# Patient Record
Sex: MALE | URBAN - METROPOLITAN AREA
[De-identification: names, ages, dates, MRNs, and addresses within clinical notes are randomized per-mention and may not be internally consistent; named-entity substitution may affect disease eponyms.]

---

## 2021-05-28 ENCOUNTER — RECORDS - HEALTHEAST (OUTPATIENT)
Dept: ADMINISTRATIVE | Facility: CLINIC | Age: 51
End: 2021-05-28

## 2021-05-29 ENCOUNTER — RECORDS - HEALTHEAST (OUTPATIENT)
Dept: ADMINISTRATIVE | Facility: CLINIC | Age: 51
End: 2021-05-29

## 2021-05-31 ENCOUNTER — RECORDS - HEALTHEAST (OUTPATIENT)
Dept: ADMINISTRATIVE | Facility: CLINIC | Age: 51
End: 2021-05-31

## 2022-01-21 ENCOUNTER — TELEPHONE (OUTPATIENT)
Dept: ONCOLOGY | Facility: CLINIC | Age: 52
End: 2022-01-21

## 2025-07-18 ENCOUNTER — HOSPITAL ENCOUNTER (EMERGENCY)
Facility: CLINIC | Age: 55
Discharge: HOME | End: 2025-07-19
Attending: EMERGENCY MEDICINE | Admitting: EMERGENCY MEDICINE

## 2025-07-18 DIAGNOSIS — L03.319 CELLULITIS AND ABSCESS OF TRUNK: ICD-10-CM

## 2025-07-18 DIAGNOSIS — R73.9 HYPERGLYCEMIA: ICD-10-CM

## 2025-07-18 DIAGNOSIS — L02.219 CELLULITIS AND ABSCESS OF TRUNK: ICD-10-CM

## 2025-07-18 LAB
ANION GAP SERPL CALCULATED.3IONS-SCNC: 13 MMOL/L (ref 7–15)
BASOPHILS # BLD AUTO: 0 10E3/UL (ref 0–0.2)
BASOPHILS NFR BLD AUTO: 1 %
BUN SERPL-MCNC: 7.8 MG/DL (ref 6–20)
CALCIUM SERPL-MCNC: 8.9 MG/DL (ref 8.8–10.4)
CHLORIDE SERPL-SCNC: 98 MMOL/L (ref 98–107)
CREAT SERPL-MCNC: 0.55 MG/DL (ref 0.67–1.17)
EGFRCR SERPLBLD CKD-EPI 2021: >90 ML/MIN/1.73M2
EOSINOPHIL # BLD AUTO: 0.1 10E3/UL (ref 0–0.7)
EOSINOPHIL NFR BLD AUTO: 1 %
ERYTHROCYTE [DISTWIDTH] IN BLOOD BY AUTOMATED COUNT: 11.6 % (ref 10–15)
GLUCOSE SERPL-MCNC: 265 MG/DL (ref 70–99)
HCO3 SERPL-SCNC: 24 MMOL/L (ref 22–29)
HCT VFR BLD AUTO: 40.4 % (ref 40–53)
HGB BLD-MCNC: 14.8 G/DL (ref 13.3–17.7)
HOLD SPECIMEN: NORMAL
IMM GRANULOCYTES # BLD: 0 10E3/UL
IMM GRANULOCYTES NFR BLD: 1 %
LYMPHOCYTES # BLD AUTO: 1.7 10E3/UL (ref 0.8–5.3)
LYMPHOCYTES NFR BLD AUTO: 19 %
MCH RBC QN AUTO: 33.4 PG (ref 26.5–33)
MCHC RBC AUTO-ENTMCNC: 36.6 G/DL (ref 31.5–36.5)
MCV RBC AUTO: 91 FL (ref 78–100)
MONOCYTES # BLD AUTO: 0.9 10E3/UL (ref 0–1.3)
MONOCYTES NFR BLD AUTO: 10 %
NEUTROPHILS # BLD AUTO: 6.1 10E3/UL (ref 1.6–8.3)
NEUTROPHILS NFR BLD AUTO: 69 %
NRBC # BLD AUTO: 0 10E3/UL
NRBC BLD AUTO-RTO: 0 /100
PLATELET # BLD AUTO: 154 10E3/UL (ref 150–450)
POTASSIUM SERPL-SCNC: 3.6 MMOL/L (ref 3.4–5.3)
RBC # BLD AUTO: 4.43 10E6/UL (ref 4.4–5.9)
SODIUM SERPL-SCNC: 135 MMOL/L (ref 135–145)
WBC # BLD AUTO: 8.8 10E3/UL (ref 4–11)

## 2025-07-18 PROCEDURE — 36415 COLL VENOUS BLD VENIPUNCTURE: CPT | Performed by: EMERGENCY MEDICINE

## 2025-07-18 PROCEDURE — 85004 AUTOMATED DIFF WBC COUNT: CPT | Performed by: EMERGENCY MEDICINE

## 2025-07-18 PROCEDURE — 80048 BASIC METABOLIC PNL TOTAL CA: CPT | Performed by: EMERGENCY MEDICINE

## 2025-07-18 PROCEDURE — 10060 I&D ABSCESS SIMPLE/SINGLE: CPT

## 2025-07-18 PROCEDURE — 99283 EMERGENCY DEPT VISIT LOW MDM: CPT | Performed by: EMERGENCY MEDICINE

## 2025-07-18 PROCEDURE — 85025 COMPLETE CBC W/AUTO DIFF WBC: CPT | Performed by: EMERGENCY MEDICINE

## 2025-07-18 ASSESSMENT — ACTIVITIES OF DAILY LIVING (ADL)
ADLS_ACUITY_SCORE: 41

## 2025-07-18 ASSESSMENT — COLUMBIA-SUICIDE SEVERITY RATING SCALE - C-SSRS
2. HAVE YOU ACTUALLY HAD ANY THOUGHTS OF KILLING YOURSELF IN THE PAST MONTH?: NO
6. HAVE YOU EVER DONE ANYTHING, STARTED TO DO ANYTHING, OR PREPARED TO DO ANYTHING TO END YOUR LIFE?: NO
1. IN THE PAST MONTH, HAVE YOU WISHED YOU WERE DEAD OR WISHED YOU COULD GO TO SLEEP AND NOT WAKE UP?: NO

## 2025-07-19 VITALS
BODY MASS INDEX: 31.99 KG/M2 | SYSTOLIC BLOOD PRESSURE: 144 MMHG | DIASTOLIC BLOOD PRESSURE: 88 MMHG | HEIGHT: 64 IN | HEART RATE: 84 BPM | WEIGHT: 187.39 LBS | TEMPERATURE: 98 F | RESPIRATION RATE: 16 BRPM | OXYGEN SATURATION: 99 %

## 2025-07-19 RX ORDER — DOXYCYCLINE 100 MG/1
100 CAPSULE ORAL 2 TIMES DAILY
Qty: 14 CAPSULE | Refills: 0 | Status: SHIPPED | OUTPATIENT
Start: 2025-07-19

## 2025-07-19 ASSESSMENT — ACTIVITIES OF DAILY LIVING (ADL)
ADLS_ACUITY_SCORE: 41
ADLS_ACUITY_SCORE: 41

## 2025-07-19 NOTE — ED TRIAGE NOTES
Sent from [facility] clinic for wound to left hip.  States wound has been present for 3 days.      Triage Assessment (Adult)       Row Name 07/18/25 2049          Triage Assessment    Airway WDL WDL        Respiratory WDL    Respiratory WDL WDL        Skin Circulation/Temperature WDL    Skin Circulation/Temperature WDL X  wound to left hip        Cardiac WDL    Cardiac WDL WDL        Peripheral/Neurovascular WDL    Peripheral Neurovascular WDL WDL        Cognitive/Neuro/Behavioral WDL    Cognitive/Neuro/Behavioral WDL WDL

## 2025-07-19 NOTE — ED PROVIDER NOTES
"  Emergency Department Note      History of Present Illness     Chief Complaint   Wound Check      HPI   Constantino Caal is a 55 year old male presenting alone for evaluation of a wound.  He reports 3 days of redness, pain, and swelling to his left inferior lateral abdomen/hip.  He tells me initially there was a \"blister\" in the area which he believes may be related to sweating and wearing a tool belt at work.  He has had chills without fever or vomiting.    Independent Historian   None    Review of External Notes   No notes available for review.    Past Medical History     Medical History and Problem List   No past medical history.  Denies history of diabetes  Physical Exam     Patient Vitals for the past 24 hrs:   BP Temp Temp src Pulse Resp SpO2 Height Weight   07/19/25 0218 -- -- -- -- -- 99 % -- --   07/19/25 0216 (!) 144/88 -- -- 84 16 98 % -- --   07/18/25 2048 (!) 150/100 98  F (36.7  C) Temporal 97 16 100 % 1.626 m (5' 4\") 85 kg (187 lb 6.3 oz)     Physical Exam  General: Well-developed and well-nourished. Well appearing middle-aged  man. Cooperative.  Head:  Atraumatic.  Eyes:  Conjunctivae, lids, and sclerae are normal.  ENT:    Normal nose. Moist mucous membranes.  Neck:  Supple. Normal range of motion.  CV:  Warm and well-perfused.    Resp:  No respiratory distress.   GI:  Non-distended.     MS:  Normal ROM.   Skin:  Warm. Non-diaphoretic. No pallor. On the inferiormost lateral left abdomen/hip, near the ASIS, is an area of erythema, warmth, and tenderness with central fluctuance as photographed below.  There is no crepitus or induration.  Neuro:  Awake. A&Ox3. Normal strength.  Psych: Normal mood and affect. Normal speech.  Vitals reviewed.        Diagnostics     Lab Results   Labs Ordered and Resulted from Time of ED Arrival to Time of ED Departure   BASIC METABOLIC PANEL - Abnormal       Result Value    Sodium 135      Potassium 3.6      Chloride 98      Carbon Dioxide (CO2) 24      Anion " Gap 13      Urea Nitrogen 7.8      Creatinine 0.55 (*)     GFR Estimate >90      Calcium 8.9      Glucose 265 (*)    CBC WITH PLATELETS AND DIFFERENTIAL - Abnormal    WBC Count 8.8      RBC Count 4.43      Hemoglobin 14.8      Hematocrit 40.4      MCV 91      MCH 33.4 (*)     MCHC 36.6 (*)     RDW 11.6      Platelet Count 154      % Neutrophils 69      % Lymphocytes 19      % Monocytes 10      % Eosinophils 1      % Basophils 1      % Immature Granulocytes 1      NRBCs per 100 WBC 0      Absolute Neutrophils 6.1      Absolute Lymphocytes 1.7      Absolute Monocytes 0.9      Absolute Eosinophils 0.1      Absolute Basophils 0.0      Absolute Immature Granulocytes 0.0      Absolute NRBCs 0.0         ED Course    Procedures   Procedures     Incision and Drainage     Procedure: Incision and Drainage     Consent: Verbal    Indication: Abscess    Location: Trunk    Size: 3 cm    Ultrasound Guidance: No    Preparation: Povidone-iodine     Anesthesia/Sedation: Lidocaine with Epinephrine - 1%     Procedure Detail:    Aspiration: No  Incision Type: Single straight  Scalpel: 11  Lesion Management: Probed and deloculated   Wound Management: Drain placed   Packing: Gauze, 1/2 inch iodoform     Patient Status: The patient tolerated the procedure well: Yes. There were no complications.    ED Course   ED Course as of 07/19/25 0325   Sat Jul 19, 2025   0145 I&D completed as above.     Additional Documentation  Social Determinants of Health: Healthcare Access/Compliance(no PCP) and Employment/Unemployment(employed)    Medical Decision Making / Diagnosis   ANAHI Meeks is a 55 year old man presenting with 3 days of worsening abscess and cellulitis of the left lateral hip/abdomen near the ASIS with no systemic signs of illness.  It sounds like this is related to sweating and wearing a tool belt.  He is well-appearing on exam with mild-moderate hypertension.  He has an abscess and cellulitis though no crepitus or other red flag  features.  He underwent incision and drainage, as above, which he tolerated quite well.  Laboratory studies were completed by triage protocol and are without leukocytosis.  Hyperglycemia is noted.  The patient was made aware of this and encouraged him to follow-up with/establish primary care as I am concerned he has undiagnosed diabetes.  He will remove his packing in 24 hours and take doxycycline as prescribed.  He will return with worsening symptoms or concerns.  All questions answered.    Disposition   The patient was discharged.     Diagnosis     ICD-10-CM    1. Cellulitis and abscess of trunk  L03.319     L02.219       2. Hyperglycemia  R73.9            Discharge Medications   Discharge Medication List as of 7/19/2025  2:05 AM        START taking these medications    Details   doxycycline hyclate (VIBRAMYCIN) 100 MG capsule Take 1 capsule (100 mg) by mouth 2 times daily., Disp-14 capsule, R-0, E-Prescribe                Yu Fine MD  07/19/25 4043

## 2025-07-19 NOTE — DISCHARGE INSTRUCTIONS
Remove the packing in 24 hours.  It is expected to continue to have some drainage.  Antibiotics as instructed even if you are feeling better.  Return immediately with increased pain, redness, swelling, or fever greater than 100.4  F.  Please call and arrange a follow-up appointment to ensure this resolves and to check your sugar again because it is very high today and I am concerned you have diabetes.